# Patient Record
Sex: FEMALE | Race: WHITE | Employment: FULL TIME | URBAN - METROPOLITAN AREA
[De-identification: names, ages, dates, MRNs, and addresses within clinical notes are randomized per-mention and may not be internally consistent; named-entity substitution may affect disease eponyms.]

---

## 2018-03-05 ENCOUNTER — OFFICE VISIT (OUTPATIENT)
Dept: FAMILY MEDICINE CLINIC | Age: 53
End: 2018-03-05

## 2018-03-05 VITALS
DIASTOLIC BLOOD PRESSURE: 68 MMHG | HEART RATE: 81 BPM | SYSTOLIC BLOOD PRESSURE: 126 MMHG | OXYGEN SATURATION: 97 % | RESPIRATION RATE: 16 BRPM | BODY MASS INDEX: 26.74 KG/M2 | TEMPERATURE: 98.1 F | WEIGHT: 191 LBS | HEIGHT: 71 IN

## 2018-03-05 DIAGNOSIS — R23.2 FACIAL FLUSHING: Primary | ICD-10-CM

## 2018-03-05 DIAGNOSIS — Z13.220 SCREENING FOR LIPID DISORDERS: ICD-10-CM

## 2018-03-05 DIAGNOSIS — Z86.000 HISTORY OF DUCTAL CARCINOMA IN SITU (DCIS) OF BREAST: ICD-10-CM

## 2018-03-05 DIAGNOSIS — Z12.39 SCREENING FOR BREAST CANCER: ICD-10-CM

## 2018-03-05 NOTE — PROGRESS NOTES
Patient: Hugo Montiel MRN: [id number]  SSN: [id number]    YOB: 1965  Age: 46 y.o. Sex: female      Date of Service: 3/5/2018   Provider: JOE Randall   Office Location:   [address] Ruben bennett, [address]  Office Phone: [phone]  Office Fax: [phone]        REASON FOR VISIT:   Chief Complaint   Patient presents with    New Patient    Physical    Other     \"heat in face\" x 1 week         VITALS:   Visit Vitals    /68 (BP 1 Location: Right arm, BP Patient Position: Sitting)    Pulse 81    Temp 98.1 °F (36.7 °C) (Oral)    Resp 16    Ht 5' 11\" (1.803 m)    Wt 191 lb (86.6 kg)    SpO2 97%    BMI 26.64 kg/m2       MEDICATIONS:   Current Outpatient Prescriptions   Medication Sig Dispense Refill    MULTIVITAMIN PO Take 1 Tab by mouth daily.  CHOLECALCIFEROL, VITAMIN D3, (VITAMIN D3 PO) Take 1 Cap by mouth daily. ALLERGIES:   No Known Allergies     ACTIVE MEDICAL PROBLEMS:  Patient Active Problem List   Diagnosis Code    Fall W19. [place] Ciara Rib injury Maharaj Picket        MEDICAL/SURGICAL HISTORY:  Past Medical History:   Diagnosis Date    Breast cancer ([address] 2005    H/O mastectomy     left side mastectomy with right side reconstruction.        Past Surgical History:   Procedure Laterality Date    HX BREAST REDUCTION  03/23/2004    HX MASTECTOMY Left     HX OOPHORECTOMY Bilateral 2008        FAMILY HISTORY:  Family History   Problem Relation Age of Onset    Alcohol abuse Mother     Breast Cancer Mother     Attention Deficit Hyperactivity Disorder Son     Dementia Maternal Grandmother     Alcohol abuse Maternal Grandfather         SOCIAL HISTORY:  Social History   Substance Use Topics    Smoking status: Never Smoker    Smokeless tobacco: Never Used    Alcohol use No          HISTORY OF PRESENT ILLNESS:   Hugo Montiel is a 46 y.o. female who presents to the office to establish care as a new patient. Patient has a history of breast cancer - DCIS left breast in 2004. She is s/p left mastectomy, and has had a breast reduction on the right. She has also had a prophylactic bilateral oophorectomy as she tested positive for the BRCA-1 gene. Patient reports she no longer follows with a breast specialist, and that her previous PCP through the [facility] had been ordering annual diagnostic mammograms, with breast MRI every other year. Otherwise, she has been in good health. Her only concern today is some mild facial flushing, on and off x the past few months. She wondered if it might be a blood pressure issue, which prompted this visit, though BP  is normal today. She describes that symptoms feel like a hot flash, as if she might start sweating. However, she went through menopause following her oophorectomy in 2008. Health Maintenance -   Previous PCP - Dr. Shawnee Buerger at [facility]   Last PE with routine labs - 1 year ago   Last pap smear - 2013, normal with negative HPV co-testing per patient, due this month   Last mammogram - breast MRI 1 year ago, due for diagnostic mammo now   Last DEXA scan - unknown/never? Last colonoscopy - January 2013, follow up interval unknown   Last flu shot - October 2017   Last pneumonia vaccine -   Last Tdap booster - imms are UTD per patient      REVIEW OF SYSTEMS:  Review of Systems   Constitutional: Negative for chills, fever and malaise/fatigue. Eyes: Negative for blurred vision and double vision. Eye exam UTD   Respiratory: Negative for cough, shortness of breath and wheezing. Cardiovascular: Negative for chest pain and palpitations. Gastrointestinal: Negative for abdominal pain, blood in stool, constipation, diarrhea, nausea and vomiting. Skin: Negative for itching and rash. Neurological: Negative for dizziness, tingling, sensory change and headaches. Psychiatric/Behavioral: Negative for depression.  The patient is not nervous/anxious and does not have insomnia. PHYSICAL EXAMINATION:  Physical Exam   Constitutional: She is oriented to person, place, and time and well-developed, well-nourished, and in no distress. HENT:   Head: Normocephalic and atraumatic. Right Ear: External ear normal.   Left Ear: External ear normal.   Nose: Nose normal.   Mouth/Throat: Oropharynx is clear and moist.   Eyes: Conjunctivae are normal. Pupils are equal, round, and reactive to light. Right eye exhibits no discharge. Left eye exhibits no discharge. Neck: Neck supple. No thyromegaly present. Cardiovascular: Normal rate, regular rhythm and normal heart sounds. Exam reveals no gallop and no friction rub. No murmur heard. Pulmonary/Chest: Effort normal and breath sounds normal. She has no wheezes. She has no rales. Abdominal: Soft. Bowel sounds are normal. She exhibits no distension and no mass. There is no tenderness. There is no rebound and no guarding. Lymphadenopathy:     She has no cervical adenopathy. Neurological: She is alert and oriented to person, place, and time. Gait normal.   Skin: Skin is warm and dry. No rash noted. Psychiatric: Mood, memory and affect normal.        RESULTS:  No results found for this visit on 03/05/18. ASSESSMENT/PLAN:  Diagnoses and all orders for this visit:    1. Facial flushing  - Normal physical exam findings today  - Will plan to check routine fasting labs and have her return for further assessment   Orders:    -     TSH 3RD GENERATION; Future  -     CBC WITH AUTOMATED DIFF; Future  -     METABOLIC PANEL, COMPREHENSIVE; Future    2. Screening for lipid disorders  Orders:    -     LIPID PANEL; Future    3. Screening for breast cancer  4.  History of ductal carcinoma in situ (DCIS) of breast  - Diagnostic mammo ordered to be done at [facility]  - Will do breast exam at upcoming well woman visit   - Patient has her records at home and will bring to next office visit  Orders:    -     CORAL MAMMO BI DX INCL CAD; Future      Follow-up Disposition:  Return in about 1 month (around 4/5/2018) for Well woman exam/pap smear. Please bring records from [facility] to that visit. Dylan Paez        PATIENT CARE TEAM:   Patient Care Team:  JOE Suarez as PCP - General (Physician Assistant)       JOE Suarez   March 5, 2018    6:44 PM

## 2018-03-05 NOTE — MR AVS SNAPSHOT
[address] 
[phone] Patient: Nikko Tate MRN: [id number] DWS:[birth year] Visit Information Date & Time Provider Department Dept. Phone Encounter #  
 3/5/2018  3:00 PM Yessi Zheng, [address] [id number] Follow-up Instructions Return in about 1 month (around 4/5/2018) for Well woman exam/pap smear. Please bring records from [facility] to that visit. Fabian Stone Upcoming Health Maintenance Date Due Hepatitis C Screening 1965 DTaP/Tdap/Td series (1 - Tdap) 4/14/1986 PAP AKA CERVICAL CYTOLOGY 4/14/1986 BREAST CANCER SCRN MAMMOGRAM 4/14/2015 FOBT Q 1 YEAR AGE 50-75 4/14/2015 Influenza Age 5 to Adult 8/1/2017 Allergies as of 3/5/2018  Review Complete On: 3/5/2018 By: JOE Raygoza No Known Allergies Current Immunizations  Never Reviewed No immunizations on file. Not reviewed this visit You Were Diagnosed With   
  
 Codes Comments Facial flushing    -  Primary ICD-10-CM: R23.2 ICD-9-CM: 782.62 Screening for lipid disorders     ICD-10-CM: Z13.220 ICD-9-CM: V77.91 Screening for breast cancer     ICD-10-CM: Z12.31 
ICD-9-CM: V76.10 History of ductal carcinoma in situ (DCIS) of breast     ICD-10-CM: Z86.000 ICD-9-CM: V13.89 Vitals BP Pulse Temp Resp Height(growth percentile) Weight(growth percentile) 126/68 (BP 1 Location: Right arm, BP Patient Position: Sitting) 81 98.1 °F (36.7 °C) (Oral) 16 5' 11\" (1.803 m) 191 lb (86.6 kg) SpO2 BMI OB Status Smoking Status 97% 26.64 kg/m2 Postmenopausal Never Smoker Vitals History BMI and BSA Data Body Mass Index Body Surface Area  
 26.64 kg/m 2 2.08 m 2 Your Updated Medication List  
  
   
This list is accurate as of 3/5/18  4:11 PM.  Always use your most recent med list.  
  
  
  
  
 MULTIVITAMIN PO  
 Take 1 Tab by mouth daily. VITAMIN D3 PO Take 1 Cap by mouth daily. Follow-up Instructions Return in about 1 month (around 4/5/2018) for Well woman exam/pap smear. Please bring records from [facility] to that visit. Chano Garland To-Do List   
 03/05/2018 Imaging:  CORAL MAMMO BI DX INCL CAD   
  
 03/05/2018 Lab:  METABOLIC PANEL, COMPREHENSIVE Around 03/06/2018 Lab:  CBC WITH AUTOMATED DIFF Around 03/06/2018 Lab:  LIPID PANEL Around 03/06/2018 Lab:  TSH 3RD GENERATION Patient Instructions A Healthy Lifestyle: Care Instructions Your Care Instructions A healthy lifestyle can help you feel good, stay at a healthy weight, and have plenty of energy for both work and play. A healthy lifestyle is something you can share with your whole family. A healthy lifestyle also can lower your risk for serious health problems, such as high blood pressure, heart disease, and diabetes. You can follow a few steps listed below to improve your health and the health of your family. Follow-up care is a key part of your treatment and safety. Be sure to make and go to all appointments, and call your doctor if you are having problems. It's also a good idea to know your test results and keep a list of the medicines you take. How can you care for yourself at home? · Do not eat too much sugar, fat, or fast foods. You can still have dessert and treats now and then. The goal is moderation. · Start small to improve your eating habits. Pay attention to portion sizes, drink less juice and soda pop, and eat more fruits and vegetables. ¨ Eat a healthy amount of food. A 3-ounce serving of meat, for example, is about the size of a deck of cards. Fill the rest of your plate with vegetables and whole grains. ¨ Limit the amount of soda and sports drinks you have every day. Drink more water when you are thirsty. ¨ Eat at least 5 servings of fruits and vegetables every day. It may seem like a lot, but it is not hard to reach this goal. A serving or helping is 1 piece of fruit, 1 cup of vegetables, or 2 cups of leafy, raw vegetables. Have an apple or some carrot sticks as an afternoon snack instead of a candy bar. Try to have fruits and/or vegetables at every meal. 
· Make exercise part of your daily routine. You may want to start with simple activities, such as walking, bicycling, or slow swimming. Try to be active 30 to 60 minutes every day. You do not need to do all 30 to 60 minutes all at once. For example, you can exercise 3 times a day for 10 or 20 minutes. Moderate exercise is safe for most people, but it is always a good idea to talk to your doctor before starting an exercise program. 
· Keep moving. Katherine Mirella the lawn, work in the garden, or [place]. Take the stairs instead of the elevator at work. · If you smoke, quit. People who smoke have an increased risk for heart attack, stroke, cancer, and other lung illnesses. Quitting is hard, but there are ways to boost your chance of quitting tobacco for good. ¨ Use nicotine gum, patches, or lozenges. ¨ Ask your doctor about stop-smoking programs and medicines. ¨ Keep trying. In addition to reducing your risk of diseases in the future, you will notice some benefits soon after you stop using tobacco. If you have shortness of breath or asthma symptoms, they will likely get better within a few weeks after you quit. · Limit how much alcohol you drink. Moderate amounts of alcohol (up to 2 drinks a day for men, 1 drink a day for women) are okay. But drinking too much can lead to liver problems, high blood pressure, and other health problems. Family health If you have a family, there are many things you can do together to improve your health. · Eat meals together as a family as often as possible. · Eat healthy foods.  This includes fruits, vegetables, lean meats and dairy, and whole grains. · Include your family in your fitness plan. Most people think of activities such as jogging or tennis as the way to fitness, but there are many ways you and your family can be more active. Anything that makes you breathe hard and gets your heart pumping is exercise. Here are some tips: 
¨ Walk to do errands or to take your child to school or the bus. ¨ Go for a family bike ride after dinner instead of watching TV. Where can you learn more? Go to http://dez-jean-paul.info/. Enter R831 in the search box to learn more about \"A Healthy Lifestyle: Care Instructions. \" Current as of: May 12, 2017 Content Version: 11.4 © 1747-2353 [employer]. Care instructions adapted under license by [employer] (which disclaims liability or warranty for this information). If you have questions about a medical condition or this instruction, always ask your healthcare professional. [place] [address] any warranty or liability for your use of this information. Introducing [facility]! [facility] introduces [employer] patient portal. Now you can access parts of your medical record, email your doctor's office, and request medication refills online. 1. In your internet browser, go to [email or web address] 2. Click on the First Time User? Click Here link in the Sign In box. You will see the New Member Sign Up page. 3. Enter your AriadNEXT Access Code exactly as it appears below. You will not need to use this code after youve completed the sign-up process. If you do not sign up before the expiration date, you must request a new code. · AriadNEXT Access Code: [email or web address] Expires: 6/3/2018  4:10 PM 
 
4. Enter the last four digits of your Social Security Number (xxxx) and Date of Birth (mm/dd/yyyy) as indicated and click Submit. You will be taken to the next sign-up page. 5. Create a [employer] ID. This will be your [employer] login ID and cannot be changed, so think of one that is secure and easy to remember. 6. Create a [employer] password. You can change your password at any time. 7. Enter your Password Reset Question and Answer. This can be used at a later time if you forget your password. 8. Enter your e-mail address. You will receive e-mail notification when new information is available in [address] 9. Click Sign Up. You can now view and download portions of your medical record. 10. Click the Download Summary menu link to download a portable copy of your medical information. If you have questions, please visit the Frequently Asked Questions section of the [employer] website. Remember, [employer] is NOT to be used for urgent needs. For medical emergencies, dial 911. Now available from your iPhone and Android! Please provide this summary of care documentation to your next provider. Your primary care clinician is listed as NONE. If you have any questions after today's visit, please call [phone].

## 2018-03-05 NOTE — PATIENT INSTRUCTIONS

## 2018-03-05 NOTE — PROGRESS NOTES
Chief Complaint   Patient presents with    New Patient    Physical    Other     \"heat in face\" x 1 week      1. Have you been to the ER, urgent care clinic since your last visit? Hospitalized since your last visit? No    2. Have you seen or consulted any other health care providers outside of the [address] [place] [address] since your last visit? Include any pap smears or colon screening.  No    Mammogram:Yearly mammogram  Pap smear: ~2013  Colonoscopy: 1/2013  Hep C screening: has not had  TDAP: UTD per patient  Flu vaccine: UTD per patient

## 2018-03-08 ENCOUNTER — HOSPITAL ENCOUNTER (OUTPATIENT)
Dept: LAB | Age: 53
Discharge: HOME | End: 2018-03-08
Payer: COMMERCIAL

## 2018-03-08 DIAGNOSIS — R23.2 FACIAL FLUSHING: ICD-10-CM

## 2018-03-08 DIAGNOSIS — Z13.220 SCREENING FOR LIPID DISORDERS: ICD-10-CM

## 2018-03-08 LAB
ALBUMIN SERPL-MCNC: 4.1 G/DL (ref 3.4–5)
ALBUMIN/GLOB SERPL: 1.3 {RATIO} (ref 0.8–1.7)
ALP SERPL-CCNC: 97 U/L (ref 45–117)
ALT SERPL-CCNC: 35 U/L (ref 13–56)
ANION GAP SERPL CALC-SCNC: 5 MMOL/L (ref 3–18)
AST SERPL-CCNC: 17 U/L (ref 15–37)
BASOPHILS # BLD: 0 K/UL (ref 0–0.1)
BASOPHILS NFR BLD: 1 % (ref 0–2)
BILIRUB SERPL-MCNC: 0.5 MG/DL (ref 0.2–1)
BUN SERPL-MCNC: 14 MG/DL (ref 7–18)
BUN/CREAT SERPL: 17 (ref 12–20)
CALCIUM SERPL-MCNC: 9.1 MG/DL (ref 8.5–10.1)
CHLORIDE SERPL-SCNC: 102 MMOL/L (ref 100–108)
CHOLEST SERPL-MCNC: 175 MG/DL
CO2 SERPL-SCNC: 33 MMOL/L (ref 21–32)
CREAT SERPL-MCNC: 0.81 MG/DL (ref 0.6–1.3)
DIFFERENTIAL METHOD BLD: NORMAL
EOSINOPHIL # BLD: 0.2 K/UL (ref 0–0.4)
EOSINOPHIL NFR BLD: 4 % (ref 0–5)
ERYTHROCYTE [DISTWIDTH] IN BLOOD BY AUTOMATED COUNT: 13.3 % (ref 11.6–14.5)
GLOBULIN SER CALC-MCNC: 3.1 G/DL (ref 2–4)
GLUCOSE SERPL-MCNC: 99 MG/DL (ref 74–99)
HCT VFR BLD AUTO: 43.2 % (ref 35–45)
HDLC SERPL-MCNC: 46 MG/DL (ref 40–60)
HDLC SERPL: 3.8 {RATIO} (ref 0–5)
HGB BLD-MCNC: 14.4 G/DL (ref 12–16)
LDLC SERPL CALC-MCNC: 112.6 MG/DL (ref 0–100)
LIPID PROFILE,FLP: ABNORMAL
LYMPHOCYTES # BLD: 1.4 K/UL (ref 0.9–3.6)
LYMPHOCYTES NFR BLD: 26 % (ref 21–52)
MCH RBC QN AUTO: 30 PG (ref 24–34)
MCHC RBC AUTO-ENTMCNC: 33.3 G/DL (ref 31–37)
MCV RBC AUTO: 90 FL (ref 74–97)
MONOCYTES # BLD: 0.4 K/UL (ref 0.05–1.2)
MONOCYTES NFR BLD: 7 % (ref 3–10)
NEUTS SEG # BLD: 3.4 K/UL (ref 1.8–8)
NEUTS SEG NFR BLD: 62 % (ref 40–73)
PLATELET # BLD AUTO: 375 K/UL (ref 135–420)
PMV BLD AUTO: 10.8 FL (ref 9.2–11.8)
POTASSIUM SERPL-SCNC: 4.2 MMOL/L (ref 3.5–5.5)
PROT SERPL-MCNC: 7.2 G/DL (ref 6.4–8.2)
RBC # BLD AUTO: 4.8 M/UL (ref 4.2–5.3)
SODIUM SERPL-SCNC: 140 MMOL/L (ref 136–145)
TRIGL SERPL-MCNC: 82 MG/DL (ref ?–150)
TSH SERPL DL<=0.05 MIU/L-ACNC: 1.87 UIU/ML (ref 0.36–3.74)
VLDLC SERPL CALC-MCNC: 16.4 MG/DL
WBC # BLD AUTO: 5.5 K/UL (ref 4.6–13.2)

## 2018-03-08 PROCEDURE — 80053 COMPREHEN METABOLIC PANEL: CPT | Performed by: PHYSICIAN ASSISTANT

## 2018-03-08 PROCEDURE — 80061 LIPID PANEL: CPT | Performed by: PHYSICIAN ASSISTANT

## 2018-03-08 PROCEDURE — 36415 COLL VENOUS BLD VENIPUNCTURE: CPT | Performed by: PHYSICIAN ASSISTANT

## 2018-03-08 PROCEDURE — 85025 COMPLETE CBC W/AUTO DIFF WBC: CPT | Performed by: PHYSICIAN ASSISTANT

## 2018-03-08 PROCEDURE — 84443 ASSAY THYROID STIM HORMONE: CPT | Performed by: PHYSICIAN ASSISTANT

## 2018-03-28 ENCOUNTER — TELEPHONE (OUTPATIENT)
Dept: FAMILY MEDICINE CLINIC | Age: 53
End: 2018-03-28

## 2018-03-28 NOTE — TELEPHONE ENCOUNTER
Called home number. Left message on answering machine to return the call. This call was concerning message below per Robinson DIAZ.

## 2018-03-28 NOTE — TELEPHONE ENCOUNTER
----- Message from JOE Manning Ala sent at 3/28/2018  7:52 AM EDT -----  Please let patient know that her labs were all normal. She was supposed to have a follow up visit for a well woman exam in a few weeks. Please see if she would like to schedule this. thanks!

## 2018-03-28 NOTE — PROGRESS NOTES
Please let patient know that her labs were all normal. She was supposed to have a follow up visit for a well woman exam in a few weeks. Please see if she would like to schedule this. thanks!

## 2018-03-28 NOTE — TELEPHONE ENCOUNTER
Called home number. Left message on answering machine to return the call. This call was concerning results below per [facility].

## 2018-03-28 NOTE — LETTER
3/29/2018 9:16 AM 
 
Ms. [address] [address] Dear Ms. Shirin Jose, We have been unable to reach you by phone to notify you of your test results. Please call our office at [phone] and ask to speak with my nurse in order to explain these results to you and advise you of any recommendations.  
 
 
 
Sincerely, 
 
 
JOE Ladd

## 2018-03-29 NOTE — TELEPHONE ENCOUNTER
Received call. Verified name and . Spoke with patient. Notified of message below per [facility]. Patient offered and accepted an appointment for 2018 at 10:30a.m. Patient understood the information provided and had no further questions or concerns.

## 2018-05-08 ENCOUNTER — TELEPHONE (OUTPATIENT)
Dept: FAMILY MEDICINE CLINIC | Age: 53
End: 2018-05-08

## 2018-05-08 ENCOUNTER — OFFICE VISIT (OUTPATIENT)
Dept: FAMILY MEDICINE CLINIC | Age: 53
End: 2018-05-08

## 2018-05-08 VITALS
WEIGHT: 203.4 LBS | HEIGHT: 71 IN | DIASTOLIC BLOOD PRESSURE: 78 MMHG | SYSTOLIC BLOOD PRESSURE: 120 MMHG | HEART RATE: 73 BPM | OXYGEN SATURATION: 97 % | BODY MASS INDEX: 28.48 KG/M2 | RESPIRATION RATE: 12 BRPM | TEMPERATURE: 97.8 F

## 2018-05-08 DIAGNOSIS — K63.5 POLYP OF COLON, UNSPECIFIED PART OF COLON, UNSPECIFIED TYPE: ICD-10-CM

## 2018-05-08 DIAGNOSIS — Z85.3 HISTORY OF BREAST CANCER: ICD-10-CM

## 2018-05-08 DIAGNOSIS — Z01.419 WELL WOMAN EXAM: Primary | ICD-10-CM

## 2018-05-08 NOTE — PROGRESS NOTES
Patient: Cheryl Holly MRN: [id number]  SSN: [id number]    YOB: 1965  Age: 48 y.o. Sex: female        Date of Service: 5/8/2018   Provider: JOE Adamson   Office Location:   Office Location:   [address] Ruben linders, [address]  Office Phone: [phone]  Office Fax: [phone]      REASON FOR VISIT:   Chief Complaint   Patient presents with    Other     Breast Exam       VITALS:   Visit Vitals    /78 (BP 1 Location: Right arm, BP Patient Position: Sitting)  Comment: manual    Pulse 73    Temp 97.8 °F (36.6 °C) (Oral)    Resp 12    Ht 5' 11\" (1.803 m)    Wt 203 lb 6.4 oz (92.3 kg)    SpO2 97%    BMI 28.37 kg/m2       MEDICATIONS:   Current Outpatient Prescriptions on File Prior to Visit   Medication Sig Dispense Refill    MULTIVITAMIN PO Take 1 Tab by mouth daily.  CHOLECALCIFEROL, VITAMIN D3, (VITAMIN D3 PO) Take 1 Cap by mouth daily. No current facility-administered medications on file prior to visit. ALLERGIES:   No Known Allergies     PAST MEDICAL HISTORY:  Past Medical History:   Diagnosis Date    BRCA gene mutation positive     Breast cancer ([facility] [address] 2005    DCIS    Colon polyp     H/O mastectomy     left side mastectomy with right side reconstruction.          SURGICAL HISTORY:  Past Surgical History:   Procedure Laterality Date    HX BREAST REDUCTION Right 03/23/2004    HX COLONOSCOPY  2013    NMCP - polyps    HX MASTECTOMY Left     HX OOPHORECTOMY Bilateral 2008    prophylactic BRCA-1        FAMILY HISTORY:  Family History   Problem Relation Age of Onset    Alcohol abuse Mother     Breast Cancer Mother     Attention Deficit Hyperactivity Disorder Son     Dementia Maternal Grandmother     Alcohol abuse Maternal Grandfather         SOCIAL HISTORY:  Social History     Social History    Marital status:      Spouse name: N/A    Number of children: N/A    Years of education: N/A Occupational History    analyst      Social History Main Topics    Smoking status: Never Smoker    Smokeless tobacco: Never Used    Alcohol use No    Drug use: No    Sexual activity: Yes     Partners: Male     Birth control/ protection: Surgical     Other Topics Concern    None     Social History Narrative        OBSTETRIC HISTORY:  OB History      Para Term  AB Living    3 2   1     SAB TAB Ectopic Molar Multiple Live Births                      MENSTRUAL HISTORY:  Age at menarche: 15 years  LMP: , patient is post-menopausal following prophylactic b/l oophorectomy for positive BRCA-1     SEXUAL HISTORY:  Sexual activity: Patient is sexually active with 1 male partner   Contraceptive method: N/A post-menopausal   History of STIs: chlamydia in 25s, adequately treated     HEALTH SCREENING HISTORY:  Last pap:   Results: negative, with negative HPV co-testing, due for repeat in 5 years ()   History of abnormal pap?: NO    Last mammogram: Breast MRI 2017 bi-rads 2, due for b/l diagnostic mammogram due to hx DCIS of the left breast   Last DEXA scan: 2014? Last colorectal screening: 2013 or , records not available at the time of this visit. Patient unsure of recommended follow up interval       HPI:  Cuong Schaefer is a 48 y.o. female who presents to the office for her annual well woman exam.       REVIEW OF SYSTEMS:   Review of Systems   Constitutional: Negative for chills, fever and malaise/fatigue. Respiratory: Negative for cough, shortness of breath and wheezing. Cardiovascular: Negative for chest pain, palpitations and leg swelling. Gastrointestinal: Negative for abdominal pain, blood in stool, constipation, diarrhea, nausea and vomiting. Breasts: Denies masses, skin changes, nipple discharge  Genitourinary: Denies pelvic pain, dyspareunia, abnormal vaginal bleeding or discharge, urinary frequency, urgency, dysuria, hematuria.      PHYSICAL EXAMINATION:   Physical Exam   Constitutional: She is oriented to person, place, and time and well-developed, well-nourished, and in no distress. HENT:   Head: Normocephalic and atraumatic. Eyes: Conjunctivae are normal. Pupils are equal, round, and reactive to light. Right eye exhibits no discharge. Left eye exhibits no discharge. Neck: No thyromegaly present. Cardiovascular: Normal rate, regular rhythm and normal heart sounds. Exam reveals no gallop and no friction rub. No murmur heard. Pulmonary/Chest: Effort normal and breath sounds normal. No stridor. She has no wheezes. She has no rales. Lymphadenopathy:     She has no cervical adenopathy. Neurological: She is alert and oriented to person, place, and time. Gait normal.   Skin: Skin is warm and dry. No rash noted. Breasts: Left breast - s/p reconstruction. Implant in place. No breast tissue palpated. No masses palpated. Left axilla without lymphadenopathy. Right breast - s/p reduction with mild superficial scarring. No masses or tenderness. ASSESSMENT/PLAN:  Diagnoses and all orders for this visit:    1. Well woman exam  - Normal physical exam findings as detailed above   - Encouraged healthy habits  - Reviewed routine health screenings     2. History of breast cancer  - DCIS left breast 2004, s/p mastectomy with reconstruction  - Normal exam findings today  - Patient had diagnostic mammo in February which was ordered by me and performed at [facility], however I never received the report. This was requested today. I will contact patient when I see the results. - Offered referral to breast surgeon for serial exams, but patient declines stating that her breast care has be done by her PCM for the past several years     3.  Polyp of colon, unspecified part of colon, unspecified type  - Colonoscopy done in Jan 2014 showed polyps, follow up is likely due in 2019.   - Report requested today and will order referral as needed based on recommendations 4. BMI 28.0-28.9,adult  - Encouraged healthy diet and exercise  - Healthy lifestyle handout included in AVS       Follow-up Disposition:  Return in about 1 year (around 5/8/2019) for CPE.      JOE Choudhary  5/8/2018   9:17 AM

## 2018-05-08 NOTE — MR AVS SNAPSHOT
[facility] [place] [address] [address] 
[phone] Patient: Atul Cobian MRN: [id number] ZVR:[id number] Visit Information Date & Time Provider Department Dept. Phone Encounter #  
 5/8/2018  8:30 AM Janice Gerardo, [facility] [id number] Follow-up Instructions Return in about 1 year (around 5/8/2019) for CPE. Upcoming Health Maintenance Date Due Hepatitis C Screening 1965 PAP AKA CERVICAL CYTOLOGY 4/14/1986 BREAST CANCER SCRN MAMMOGRAM 4/14/2015 FOBT Q 1 YEAR AGE 50-75 4/14/2015 Influenza Age 5 to Adult 8/1/2018 DTaP/Tdap/Td series (4 - Td) 3/6/2022 Allergies as of 5/8/2018  Review Complete On: 5/8/2018 By: JOE Damon No Known Allergies Current Immunizations  Never Reviewed Name Date Hep A Vaccine 7/5/2000, 11/18/1998 Hep B Vaccine 9/4/2003, 8/10/1994, 7/8/1994 IPV 12/7/1983 Influenza Vaccine 12/11/2017, 10/1/2017 MMR 7/5/2000 Meningococcal (MPSV4) Vaccine 12/7/1983 Td 5/2/2005, 1/5/1994, 1/20/1984, 12/20/1983 Tdap 3/6/2012 Typhoid Vaccine 5/2/2005, 11/18/1998, 6/10/1996, 1/5/1994, 5/17/1991 Varicella Virus Vaccine 5/2/2005 Yellow Fever Vaccine 1/26/2006, 1/5/1994, 12/20/1984 Not reviewed this visit Vitals BP Pulse Temp Resp Height(growth percentile) Weight(growth percentile) 120/78 (BP 1 Location: Right arm, BP Patient Position: Sitting) 73 97.8 °F (36.6 °C) (Oral) 12 5' 11\" (1.803 m) 203 lb 6.4 oz (92.3 kg) SpO2 BMI OB Status Smoking Status 97% 28.37 kg/m2 Postmenopausal Never Smoker Vitals History BMI and BSA Data Body Mass Index Body Surface Area  
 28.37 kg/m 2 2.15 m 2 Your Updated Medication List  
  
   
This list is accurate as of 5/8/18  9:02 AM.  Always use your most recent med list.  
  
  
  
  
 MULTIVITAMIN PO Take 1 Tab by mouth daily. VITAMIN D3 PO Take 1 Cap by mouth daily. Follow-up Instructions Return in about 1 year (around 5/8/2019) for CPE. Patient Instructions A Healthy Lifestyle: Care Instructions Your Care Instructions A healthy lifestyle can help you feel good, stay at a healthy weight, and have plenty of energy for both work and play. A healthy lifestyle is something you can share with your whole family. A healthy lifestyle also can lower your risk for serious health problems, such as high blood pressure, heart disease, and diabetes. You can follow a few steps listed below to improve your health and the health of your family. Follow-up care is a key part of your treatment and safety. Be sure to make and go to all appointments, and call your doctor if you are having problems. It's also a good idea to know your test results and keep a list of the medicines you take. How can you care for yourself at home? · Do not eat too much sugar, fat, or fast foods. You can still have dessert and treats now and then. The goal is moderation. · Start small to improve your eating habits. Pay attention to portion sizes, drink less juice and soda pop, and eat more fruits and vegetables. ¨ Eat a healthy amount of food. A 3-ounce serving of meat, for example, is about the size of a deck of cards. Fill the rest of your plate with vegetables and whole grains. ¨ Limit the amount of soda and sports drinks you have every day. Drink more water when you are thirsty. ¨ Eat at least 5 servings of fruits and vegetables every day. It may seem like a lot, but it is not hard to reach this goal. A serving or helping is 1 piece of fruit, 1 cup of vegetables, or 2 cups of leafy, raw vegetables. Have an apple or some carrot sticks as an afternoon snack instead of a candy bar. Try to have fruits and/or vegetables at every meal. 
· Make exercise part of your daily routine.  You may want to start with simple activities, such as walking, bicycling, or slow swimming. Try to be active 30 to 60 minutes every day. You do not need to do all 30 to 60 minutes all at once. For example, you can exercise 3 times a day for 10 or 20 minutes. Moderate exercise is safe for most people, but it is always a good idea to talk to your doctor before starting an exercise program. 
· Keep moving. Helen Payne the lawn, work in the garden, or Petrabytes. Take the stairs instead of the elevator at work. · If you smoke, quit. People who smoke have an increased risk for heart attack, stroke, cancer, and other lung illnesses. Quitting is hard, but there are ways to boost your chance of quitting tobacco for good. ¨ Use nicotine gum, patches, or lozenges. ¨ Ask your doctor about stop-smoking programs and medicines. ¨ Keep trying. In addition to reducing your risk of diseases in the future, you will notice some benefits soon after you stop using tobacco. If you have shortness of breath or asthma symptoms, they will likely get better within a few weeks after you quit. · Limit how much alcohol you drink. Moderate amounts of alcohol (up to 2 drinks a day for men, 1 drink a day for women) are okay. But drinking too much can lead to liver problems, high blood pressure, and other health problems. Family health If you have a family, there are many things you can do together to improve your health. · Eat meals together as a family as often as possible. · Eat healthy foods. This includes fruits, vegetables, lean meats and dairy, and whole grains. · Include your family in your fitness plan. Most people think of activities such as jogging or tennis as the way to fitness, but there are many ways you and your family can be more active. Anything that makes you breathe hard and gets your heart pumping is exercise. Here are some tips: 
¨ Walk to do errands or to take your child to school or the bus. ¨ Go for a family bike ride after dinner instead of watching TV. Where can you learn more? Go to http://dez-jean-paul.info/. Enter X234 in the search box to learn more about \"A Healthy Lifestyle: Care Instructions. \" Current as of: May 12, 2017 Content Version: 11.4 © 3522-2189 [employer]. Care instructions adapted under license by [employer] (which disclaims liability or warranty for this information). If you have questions about a medical condition or this instruction, always ask your healthcare professional. [address] any warranty or liability for your use of this information. Introducing [facility]! [employer] introduces Lifeables patient portal. Now you can access parts of your medical record, email your doctor's office, and request medication refills online. 1. In your internet browser, go to [email or web address] 2. Click on the First Time User? Click Here link in the Sign In box. You will see the New Member Sign Up page. 3. Enter your Lifeables Access Code exactly as it appears below. You will not need to use this code after youve completed the sign-up process. If you do not sign up before the expiration date, you must request a new code. · Lifeables Access Code: [email or web address] Expires: 6/3/2018  5:10 PM 
 
4. Enter the last four digits of your Social Security Number (xxxx) and Date of Birth (mm/dd/yyyy) as indicated and click Submit. You will be taken to the next sign-up page. 5. Create a Arcadian Networkst ID. This will be your Lifeables login ID and cannot be changed, so think of one that is secure and easy to remember. 6. Create a Lifeables password. You can change your password at any time. 7. Enter your Password Reset Question and Answer. This can be used at a later time if you forget your password. 8. Enter your e-mail address.  You will receive e-mail notification when new information is available in CCBR-SYNARC. 9. Click Sign Up. You can now view and download portions of your medical record. 10. Click the Download Summary menu link to download a portable copy of your medical information. If you have questions, please visit the Frequently Asked Questions section of the CCBR-SYNARC website. Remember, CCBR-SYNARC is NOT to be used for urgent needs. For medical emergencies, dial 911. Now available from your iPhone and Android! Please provide this summary of care documentation to your next provider. Your primary care clinician is listed as Jason Martin. If you have any questions after today's visit, please call [phone].

## 2018-05-08 NOTE — PATIENT INSTRUCTIONS

## 2018-05-08 NOTE — PROGRESS NOTES
Chief Complaint   Patient presents with    Other     Breast Exam     Visit Vitals    /78 (BP 1 Location: Right arm, BP Patient Position: Sitting)    Pulse 73    Temp 97.8 °F (36.6 °C) (Oral)    Resp 12    Ht 5' 11\" (1.803 m)    Wt 203 lb 6.4 oz (92.3 kg)    SpO2 97%    BMI 28.37 kg/m2       Patient is not fasting. Patient in room # 6.     1. Have you been to the ER, urgent care clinic since your last visit? Hospitalized since your last visit? No    2. Have you seen or consulted any other health care providers outside of the [address] [place] [address] since your last visit? Include any pap smears or colon screening. Yes When: 02/2018 Where: [facility] Reason for visit: mammogram     Reviewed. Colonoscopy 2014, patient states 10 year schedule.

## 2018-05-09 NOTE — TELEPHONE ENCOUNTER
Attempted to reach patient, left another message for her to call me back. If I am unavailable, okay to transfer call to [facility] to relay the following:     Mammogram was fine - we will resume her normal schedule of alternating MRIs and diagnostic mammograms every other year. Colonoscopy - report showed 1 hyperplastic polyp in the rectum, follow up will be due in 5 years (January 2019). Since that is still over 6 months away, I will set myself a reminder so that we can reach out to her toward the end of this year to set up referral to Dr. Emily Fagan.

## 2018-05-09 NOTE — TELEPHONE ENCOUNTER
Called home number. Verified name and . Spoke with patient. Patient notified of message  below per [facility]. Patient understood the reason for call and had no further questions or concerns.

## 2018-09-21 ENCOUNTER — TELEPHONE (OUTPATIENT)
Dept: FAMILY MEDICINE CLINIC | Age: 53
End: 2018-09-21

## 2018-09-21 NOTE — TELEPHONE ENCOUNTER
Antoin[address]. Verified name and . Spoke with Jerri Nettles in GI. Notified of calling to get schedule for Colonoscopy is patient on a 5 or 10 year schedule. Per Jerri Nettles not note saying patient schedule. Jerri Nettles directed me to call medical records. Jerri Nettles understood the reason for call and had no further questions or concerns. Found note patient due 2019.

## 2018-12-17 ENCOUNTER — TELEPHONE (OUTPATIENT)
Dept: FAMILY MEDICINE CLINIC | Age: 53
End: 2018-12-17

## 2018-12-17 DIAGNOSIS — K63.5 POLYP OF COLON, UNSPECIFIED PART OF COLON, UNSPECIFIED TYPE: ICD-10-CM

## 2018-12-17 DIAGNOSIS — Z12.11 SCREENING FOR COLON CANCER: Primary | ICD-10-CM

## 2018-12-17 NOTE — TELEPHONE ENCOUNTER
Called home number. Verified name and . Spoke with patient. Notified of message below per provider. Per patient she has moved to [place] and has a provider there. Per patient she has signed release for them to get records from our office. Patient stated she will come to [place] every 3 months as she still holds a position in [employer] but will get all care in [place]. Patient stated Georges Merlin will not be her provider. Patient was notified to make sure her provider sets her up however they need to for her colonoscopy due in January. Patient had no further questions or concerns.

## 2018-12-17 NOTE — TELEPHONE ENCOUNTER
Patient is due in January for her 5 year surveillance colonoscopy. Please let her know that I'll be ordering a referral to colorectal so that she can get scheduled in the new year.

## 2019-01-31 ENCOUNTER — HOSPITAL ENCOUNTER (OUTPATIENT)
Dept: GENERAL RADIOLOGY | Age: 54
Discharge: HOME | End: 2019-01-31
Payer: COMMERCIAL

## 2019-01-31 ENCOUNTER — OFFICE VISIT (OUTPATIENT)
Dept: FAMILY MEDICINE CLINIC | Age: 54
End: 2019-01-31

## 2019-01-31 ENCOUNTER — HOSPITAL ENCOUNTER (OUTPATIENT)
Dept: LAB | Age: 54
Discharge: HOME | End: 2019-01-31
Payer: COMMERCIAL

## 2019-01-31 VITALS
WEIGHT: 193.6 LBS | HEART RATE: 59 BPM | TEMPERATURE: 97.6 F | OXYGEN SATURATION: 99 % | DIASTOLIC BLOOD PRESSURE: 68 MMHG | HEIGHT: 71 IN | BODY MASS INDEX: 27.1 KG/M2 | SYSTOLIC BLOOD PRESSURE: 120 MMHG | RESPIRATION RATE: 12 BRPM

## 2019-01-31 DIAGNOSIS — M25.475 BILATERAL SWELLING OF FEET AND ANKLES: ICD-10-CM

## 2019-01-31 DIAGNOSIS — M25.475 BILATERAL SWELLING OF FEET AND ANKLES: Primary | ICD-10-CM

## 2019-01-31 DIAGNOSIS — M25.471 BILATERAL SWELLING OF FEET AND ANKLES: ICD-10-CM

## 2019-01-31 DIAGNOSIS — M25.472 BILATERAL SWELLING OF FEET AND ANKLES: ICD-10-CM

## 2019-01-31 DIAGNOSIS — M25.474 BILATERAL SWELLING OF FEET AND ANKLES: ICD-10-CM

## 2019-01-31 DIAGNOSIS — M25.471 BILATERAL SWELLING OF FEET AND ANKLES: Primary | ICD-10-CM

## 2019-01-31 DIAGNOSIS — M25.472 BILATERAL SWELLING OF FEET AND ANKLES: Primary | ICD-10-CM

## 2019-01-31 DIAGNOSIS — M25.474 BILATERAL SWELLING OF FEET AND ANKLES: Primary | ICD-10-CM

## 2019-01-31 LAB
ANION GAP SERPL CALC-SCNC: 4 MMOL/L (ref 3–18)
BUN SERPL-MCNC: 13 MG/DL (ref 7–18)
BUN/CREAT SERPL: 16 (ref 12–20)
CALCIUM SERPL-MCNC: 8.8 MG/DL (ref 8.5–10.1)
CHLORIDE SERPL-SCNC: 106 MMOL/L (ref 100–108)
CO2 SERPL-SCNC: 31 MMOL/L (ref 21–32)
CREAT SERPL-MCNC: 0.82 MG/DL (ref 0.6–1.3)
GLUCOSE SERPL-MCNC: 100 MG/DL (ref 74–99)
POTASSIUM SERPL-SCNC: 4.1 MMOL/L (ref 3.5–5.5)
SODIUM SERPL-SCNC: 141 MMOL/L (ref 136–145)
TSH SERPL DL<=0.05 MIU/L-ACNC: 1.18 UIU/ML (ref 0.36–3.74)

## 2019-01-31 PROCEDURE — 36415 COLL VENOUS BLD VENIPUNCTURE: CPT

## 2019-01-31 PROCEDURE — 80048 BASIC METABOLIC PNL TOTAL CA: CPT

## 2019-01-31 PROCEDURE — 73610 X-RAY EXAM OF ANKLE: CPT

## 2019-01-31 PROCEDURE — 84443 ASSAY THYROID STIM HORMONE: CPT

## 2019-01-31 NOTE — PROGRESS NOTES
Patient: Luis Miguel Drake MRN: [id number]  SSN: [id number]    YOB: 1965  Age: 48 y.o. Sex: female      Date of Service: 1/31/2019   Provider: JOE Lorenzo   Office Location:   [facility] [address], [address]  Office Phone: [phone]  Office Fax: [phone]        REASON FOR VISIT:   Chief Complaint   Patient presents with    Ankle swelling     2 weeks        VITALS:   Visit Vitals  /68 (BP 1 Location: Right arm, BP Patient Position: Sitting)   Pulse (!) 59   Temp 97.6 °F (36.4 °C) (Oral)   Resp 12   Ht 5' 11\" (1.803 m)   Wt 193 lb 9.6 oz (87.8 kg)   SpO2 99%   BMI 27.00 kg/m²       MEDICATIONS:   Current Outpatient Medications   Medication Sig Dispense Refill    MULTIVITAMIN PO Take 1 Tab by mouth daily.  CHOLECALCIFEROL, VITAMIN D3, (VITAMIN D3 PO) Take 1 Cap by mouth daily. ALLERGIES:   No Known Allergies     ACTIVE MEDICAL PROBLEMS:  Patient Active Problem List   Diagnosis Code    Polyp of colon K63.5    History of breast cancer Z85.3          HISTORY OF PRESENT ILLNESS:   Luis Miguel Drake is a 48 y.o. female who presents to the office for acute care. She moved to [place] but is in town for a few days and will still be coming here as needed for acute issues. Patient reports that about a month ago, she started working with a , and admits she thinks she was doing too much too quickly. She has since developed swelling in her b/l ankles, L > R, which seems to come and go and worsens after being on her feet for a long stretch of time. She admits her ankles feel sore when she is doing certain exercises, but there is no severe pain and she does not recall any specific injury associated with the onset of pain. She read some things online about the different causes of lower extremity edema and figured she should get checked out.    Denies any fevers, chest pain, SOB, heart palpitations, cough, redness or bruising to the ankles/legs. No personal or family hx of blood clots. REVIEW OF SYSTEMS:  ROS as noted in HPI     PHYSICAL EXAMINATION:  Physical Exam   Constitutional: She is oriented to person, place, and time and well-developed, well-nourished, and in no distress. Cardiovascular: Normal rate, regular rhythm, normal heart sounds and intact distal pulses. Exam reveals no gallop and no friction rub. No murmur heard. trace pitting edema b/l ankles   Pulmonary/Chest: Effort normal and breath sounds normal. She has no wheezes. She has no rales. Musculoskeletal:        Right ankle: She exhibits swelling ( mild). She exhibits normal range of motion, no ecchymosis and no deformity. No tenderness. Achilles tendon exhibits no pain, no defect and normal Gautam's test results. Left ankle: She exhibits swelling ( mild). She exhibits normal range of motion, no ecchymosis, no deformity, no laceration and normal pulse. No tenderness. Achilles tendon exhibits no pain, no defect and normal Gautam's test results. Neurological: She is alert and oriented to person, place, and time. Gait normal.   Skin: Skin is warm and dry. No rash noted. Psychiatric: Mood, memory and affect normal.        RESULTS:  No results found for this visit on 01/31/19. ASSESSMENT/PLAN:  Diagnoses and all orders for this visit:    1. Bilateral swelling of feet and ankles  - Reassurance provided. I suspect she has some degree of osteoarthritis in her ankles, likely aggravated by recent increase in physical activity  - Suggested rest, ice, elevation, anti-inflammatories. return to activity gradually and consider using ankle braces for support   - will check renal and thyroid function  - follow up if symptoms persist or worsen  Orders:    -     XR ANKLE LT MIN 3 V; Future  -     XR ANKLE RT MIN 3 V; Future  -     METABOLIC PANEL, BASIC; Future  -     TSH 3RD GENERATION; Future      All questions answered.  Patient expresses understanding and agrees with the plan as detailed above. Follow-up Disposition:  Return for prn.        PATIENT CARE TEAM:   No care team member to display       JOE Morin   January 31, 2019   8:27 AM

## 2019-01-31 NOTE — PATIENT INSTRUCTIONS
A Healthy Lifestyle: Care Instructions  Your Care Instructions    A healthy lifestyle can help you feel good, stay at a healthy weight, and have plenty of energy for both work and play. A healthy lifestyle is something you can share with your whole family. A healthy lifestyle also can lower your risk for serious health problems, such as high blood pressure, heart disease, and diabetes. You can follow a few steps listed below to improve your health and the health of your family. Follow-up care is a key part of your treatment and safety. Be sure to make and go to all appointments, and call your doctor if you are having problems. It's also a good idea to know your test results and keep a list of the medicines you take. How can you care for yourself at home? · Do not eat too much sugar, fat, or fast foods. You can still have dessert and treats now and then. The goal is moderation. · Start small to improve your eating habits. Pay attention to portion sizes, drink less juice and soda pop, and eat more fruits and vegetables. ? Eat a healthy amount of food. A 3-ounce serving of meat, for example, is about the size of a deck of cards. Fill the rest of your plate with vegetables and whole grains. ? Limit the amount of soda and sports drinks you have every day. Drink more water when you are thirsty. ? Eat at least 5 servings of fruits and vegetables every day. It may seem like a lot, but it is not hard to reach this goal. A serving or helping is 1 piece of fruit, 1 cup of vegetables, or 2 cups of leafy, raw vegetables. Have an apple or some carrot sticks as an afternoon snack instead of a candy bar. Try to have fruits and/or vegetables at every meal.  · Make exercise part of your daily routine. You may want to start with simple activities, such as walking, bicycling, or slow swimming. Try to be active 30 to 60 minutes every day. You do not need to do all 30 to 60 minutes all at once.  For example, you can exercise 3 times a day for 10 or 20 minutes. Moderate exercise is safe for most people, but it is always a good idea to talk to your doctor before starting an exercise program.  · Keep moving. Moon [place] the lawn, work in the garden, or Twice. Take the stairs instead of the elevator at work. · If you smoke, quit. People who smoke have an increased risk for heart attack, stroke, cancer, and other lung illnesses. Quitting is hard, but there are ways to boost your chance of quitting tobacco for good. ? Use nicotine gum, patches, or lozenges. ? Ask your doctor about stop-smoking programs and medicines. ? Keep trying. In addition to reducing your risk of diseases in the future, you will notice some benefits soon after you stop using tobacco. If you have shortness of breath or asthma symptoms, they will likely get better within a few weeks after you quit. · Limit how much alcohol you drink. Moderate amounts of alcohol (up to 2 drinks a day for men, 1 drink a day for women) are okay. But drinking too much can lead to liver problems, high blood pressure, and other health problems. Family health  If you have a family, there are many things you can do together to improve your health. · Eat meals together as a family as often as possible. · Eat healthy foods. This includes fruits, vegetables, lean meats and dairy, and whole grains. · Include your family in your fitness plan. Most people think of activities such as jogging or tennis as the way to fitness, but there are many ways you and your family can be more active. Anything that makes you breathe hard and gets your heart pumping is exercise. Here are some tips:  ? Walk to do errands or to take your child to school or the bus.  ? Go for a family bike ride after dinner instead of watching TV. Where can you learn more? Go to http://dez-jean-paul.info/. Enter Y465 in the search box to learn more about \"A Healthy Lifestyle: Care Instructions. \"  Current as of: September 11, 2018  Content Version: 11.9  © 1731-5846 [employer]. Care instructions adapted under license by [employer] (which disclaims liability or warranty for this information). If you have questions about a medical condition or this instruction, always ask your healthcare professional. [address] any warranty or liability for your use of this information.

## 2019-01-31 NOTE — PROGRESS NOTES
Chief Complaint   Patient presents with    Ankle swelling     2 weeks     Patient is not fasting. Patient in room # 5.       1. Have you been to the ER, urgent care clinic since your last visit? Hospitalized since your last visit? No    2. Have you seen or consulted any other health care providers outside of the [address] [place] [address] since your last visit? Include any pap smears or colon screening. Yes When: 01/2019 Where: Provider Dr. Salma Garcia, in [facility] Reason for visit: [facility] care     Reviewed.

## 2019-02-01 ENCOUNTER — TELEPHONE (OUTPATIENT)
Dept: FAMILY MEDICINE CLINIC | Age: 54
End: 2019-02-01

## 2019-02-01 NOTE — TELEPHONE ENCOUNTER
Received message from radiologist, who states that the area of concern in the R distal tibia is most likely focal osteopenia, however, metastatic lesion or multiple myeloma cannot be ruled out. Recommendation was for SPEP/UPEP and repeat radiograph in 1-2 months, followed by MRI if there is any growth of the lesion. Second attempt made to reach patient with these results. Ideally, I'd like her permission to speak with her PCP in [place], Dr. Chad Sánchez to ensure that she has adequate follow up when she gets home. I would also recommend that she picks up a copy of her images on a disc from radiology to take with her.

## 2019-02-01 NOTE — TELEPHONE ENCOUNTER
Patient called returning nurse phone call regarding recent x-ray results. Please call patient back at your earliest convenience.

## 2019-02-04 NOTE — TELEPHONE ENCOUNTER
Spoke to patient and reviewed x-ray and lab results. Discussed that radiology had recommended follow up with MRI and labs whereas ortho had suggested CT with IV contrast. I encouraged the patient to get a copy of her images on a disc before she returns to [place]. I will reach out to her PCP there to notify her of the findings to ensure close follow up.

## 2019-02-05 NOTE — TELEPHONE ENCOUNTER
Spoke with Freya Camacho RN at Dr. Catherine Cook office and notified her of patient's x-ray findings and need for close follow up. Will fax note & imaging results to Dr. Catherine Cook office.      Fax: [phone]  Phone (Farhana's direct line): [phone]